# Patient Record
Sex: FEMALE | Race: ASIAN | URBAN - METROPOLITAN AREA
[De-identification: names, ages, dates, MRNs, and addresses within clinical notes are randomized per-mention and may not be internally consistent; named-entity substitution may affect disease eponyms.]

---

## 2020-03-01 ENCOUNTER — OFFICE VISIT (OUTPATIENT)
Dept: FAMILY MEDICINE CLINIC | Facility: CLINIC | Age: 45
End: 2020-03-01
Payer: COMMERCIAL

## 2020-03-01 VITALS
DIASTOLIC BLOOD PRESSURE: 89 MMHG | TEMPERATURE: 98 F | SYSTOLIC BLOOD PRESSURE: 146 MMHG | WEIGHT: 149.94 LBS | BODY MASS INDEX: 24.98 KG/M2 | HEIGHT: 64.96 IN | OXYGEN SATURATION: 100 % | HEART RATE: 74 BPM

## 2020-03-01 DIAGNOSIS — L03.031 PARONYCHIA OF GREAT TOE, RIGHT: Primary | ICD-10-CM

## 2020-03-01 PROCEDURE — 99202 OFFICE O/P NEW SF 15 MIN: CPT | Performed by: NURSE PRACTITIONER

## 2020-03-01 RX ORDER — CEPHALEXIN 500 MG/1
500 CAPSULE ORAL 2 TIMES DAILY
Qty: 14 CAPSULE | Refills: 0 | Status: SHIPPED | OUTPATIENT
Start: 2020-03-01 | End: 2020-03-08

## 2020-03-01 NOTE — PROGRESS NOTES
CHIEF COMPLAINT:   Patient presents with:  Skin: toe infection      HPI:     Alexandr Benton is a 53year old female who presents with  for concerns of right first toe infection. Patient states symptoms present 2  weeks.   Reports erythema, increased warm EXTREMITIES: no cyanosis, clubbing or edema. Cap refill brisk- less than 2 seconds. LYMPH: no cervical lymphadenopathy.     PSYCH: pleasant mood and affect  NEURO: no focal deficits    ASSESSMENT AND PLAN:     ASSESSMENT:  Paronychia of great toe, right · You may use over-the-counter medicine (acetaminophen or ibuprofen to help with pain, unless another medicine was prescribed. If you have chronic liver or kidney disease, talk with your healthcare provider before using these medicines.  Also talk with your

## 2020-03-01 NOTE — PATIENT INSTRUCTIONS
Paronychia of the Finger or Toe  Paronychia is an infection near a fingernail or toenail. It usually occurs when an opening in the cuticle or an ingrown toenail lets bacteria under the skin. The infection will need to be drained if pus is present.  If th · Fever of 100.4ºF (38ºC) or higher, or as directed by your provider  Date Last Reviewed: 8/1/2016  © 7946-9137 [employer]. [address] [facility] [place], [address] [place] [address]. All rights reserved.  This information is not intended as a substitute for

## 2020-03-07 ENCOUNTER — OFFICE VISIT (OUTPATIENT)
Dept: FAMILY MEDICINE CLINIC | Facility: CLINIC | Age: 45
End: 2020-03-07
Payer: COMMERCIAL

## 2020-03-07 VITALS
WEIGHT: 159.38 LBS | TEMPERATURE: 98 F | SYSTOLIC BLOOD PRESSURE: 144 MMHG | HEIGHT: 65 IN | OXYGEN SATURATION: 98 % | HEART RATE: 77 BPM | BODY MASS INDEX: 26.55 KG/M2 | RESPIRATION RATE: 16 BRPM | DIASTOLIC BLOOD PRESSURE: 70 MMHG

## 2020-03-07 DIAGNOSIS — L60.0 INGROWN TOENAIL OF RIGHT FOOT: Primary | ICD-10-CM

## 2020-03-07 PROCEDURE — 99212 OFFICE O/P EST SF 10 MIN: CPT | Performed by: NURSE PRACTITIONER

## 2020-03-07 NOTE — PROGRESS NOTES
CHIEF COMPLAINT:   Patient presents with:  Toenail: right big toe      HPI:     Gustavo Erickson is a 53year old female who presents with concerns of right great toe pain. Patient first noticed symptoms about 3 weeks ago.   She was seen here on 3/1 and started o NECK: supple, non-tender  LUNGS: clear to auscultation bilaterally, no wheezes or rhonchi. Breathing is non labored. CARDIO: RRR without murmur  LYMPH: No lymphadenopathy. EXTREMITIES: no cyanosis, clubbing or edema.   Cap refill brisk- less than 2 seco 1. Rest. Keep area clean. 2. Warm soaks as discussed. 3. Tylenol/Ibuprofen for pain. 4. Call Podiatry (Dr. Aletha Murray office info provided) on Monday to schedule appt this week.  Go to the emergency department immediately if symptoms worsen, change, or · You may use acetaminophen or ibuprofen for pain, unless another pain medicine was prescribed. Talk with your healthcare provider before using these medicines if you have chronic liver or kidney disease.  Also tell your provider if you have ever had a stom